# Patient Record
Sex: FEMALE | Race: WHITE | NOT HISPANIC OR LATINO | URBAN - METROPOLITAN AREA
[De-identification: names, ages, dates, MRNs, and addresses within clinical notes are randomized per-mention and may not be internally consistent; named-entity substitution may affect disease eponyms.]

---

## 2021-10-27 ENCOUNTER — LAB OUTSIDE AN ENCOUNTER (OUTPATIENT)
Dept: URBAN - METROPOLITAN AREA CLINIC 72 | Facility: CLINIC | Age: 26
End: 2021-10-27

## 2021-10-27 ENCOUNTER — OFFICE VISIT (OUTPATIENT)
Dept: URBAN - METROPOLITAN AREA CLINIC 72 | Facility: CLINIC | Age: 26
End: 2021-10-27
Payer: SELF-PAY

## 2021-10-27 ENCOUNTER — WEB ENCOUNTER (OUTPATIENT)
Dept: URBAN - METROPOLITAN AREA CLINIC 72 | Facility: CLINIC | Age: 26
End: 2021-10-27

## 2021-10-27 VITALS
HEART RATE: 72 BPM | WEIGHT: 131 LBS | BODY MASS INDEX: 21.83 KG/M2 | TEMPERATURE: 98.1 F | RESPIRATION RATE: 18 BRPM | SYSTOLIC BLOOD PRESSURE: 97 MMHG | HEIGHT: 65 IN | DIASTOLIC BLOOD PRESSURE: 53 MMHG

## 2021-10-27 DIAGNOSIS — K59.01 SLOW TRANSIT CONSTIPATION: ICD-10-CM

## 2021-10-27 DIAGNOSIS — R14.0 BLOATING: ICD-10-CM

## 2021-10-27 PROCEDURE — 99204 OFFICE O/P NEW MOD 45 MIN: CPT | Performed by: INTERNAL MEDICINE

## 2021-10-27 RX ORDER — SIMETHICONE 80 MG/1
1 TABLET AFTER MEALS AND AT BEDTIME AS NEEDED TABLET, CHEWABLE ORAL
Status: ACTIVE | COMMUNITY

## 2021-10-27 RX ORDER — POLYETHYLENE GLYCOL 3350, SODIUM SULFATE ANHYDROUS, SODIUM BICARBONATE, SODIUM CHLORIDE, POTASSIUM CHLORIDE 236; 22.74; 6.74; 5.86; 2.97 G/4L; G/4L; G/4L; G/4L; G/4L
AS DIRECTED POWDER, FOR SOLUTION ORAL ONCE
Qty: 1 | Refills: 0 | OUTPATIENT
Start: 2021-10-27 | End: 2021-10-28

## 2021-10-27 NOTE — HPI-TODAY'S VISIT:
Ms. Arce is a pleasant 26-year-old female who presents as a new patient for consultation for constipation.  She was referred by Dr. Luca Pat. In review of records she has had an upper endoscopy in 2015 for nausea and vomiting that was unremarkable. She reports that for the last 1 to 2 years she has been suffering from intermittent constipation.  She notes that she has to take laxatives in order to have a bowel movement.  She takes numerous supplements to assist with this including cascara, senna, stool softener, IBgard and FD guard, digestive enzymes etc.  She takes gas relief as well.  Associated with his constipation is significant bloating.  She notes that everything she seems the bloats her gives her abdominal discomfort.  She reports that she has siblings that were diagnosed with celiac disease.  In review of her previous upper endoscopy 2015 there is no evidence of celiac disease. 5 she denies any blood in her stool.  She used to eat a vegan diet, that did not help with her bowels.  She notes that when she went to urgent care she was given GoLYTELY which produced 3-4 small bowel movements and liquid stool thereafter.  She still felt bloated for stool.  She reports that she is done bowel cleanouts with MiraLAX and Gatorade and gets minimal stool production and then loose stools.  She denies any weight loss.  No vomiting but generalized dyspepsia and bloating sensation as well as generalized abdominal discomfort.

## 2021-10-29 ENCOUNTER — OFFICE VISIT (OUTPATIENT)
Dept: URBAN - METROPOLITAN AREA MEDICAL CENTER 40 | Facility: MEDICAL CENTER | Age: 26
End: 2021-10-29

## 2021-11-12 ENCOUNTER — TELEPHONE ENCOUNTER (OUTPATIENT)
Dept: URBAN - METROPOLITAN AREA CLINIC 72 | Facility: CLINIC | Age: 26
End: 2021-11-12

## 2021-11-18 ENCOUNTER — TELEPHONE ENCOUNTER (OUTPATIENT)
Dept: URBAN - METROPOLITAN AREA CLINIC 72 | Facility: CLINIC | Age: 26
End: 2021-11-18

## 2021-12-15 ENCOUNTER — TELEPHONE ENCOUNTER (OUTPATIENT)
Dept: URBAN - METROPOLITAN AREA CLINIC 72 | Facility: CLINIC | Age: 26
End: 2021-12-15

## 2021-12-15 RX ORDER — SIMETHICONE 80 MG/1
1 TABLET AFTER MEALS AND AT BEDTIME AS NEEDED TABLET, CHEWABLE ORAL
Status: ACTIVE | COMMUNITY

## 2021-12-15 RX ORDER — LINACLOTIDE 290 UG/1
1 CAPSULE AT LEAST 30 MINUTES BEFORE THE FIRST MEAL OF THE DAY ON AN EMPTY STOMACH CAPSULE, GELATIN COATED ORAL ONCE A DAY
Qty: 30 | Refills: 3 | OUTPATIENT
Start: 2021-12-15 | End: 2022-04-14

## 2022-02-09 ENCOUNTER — TELEPHONE ENCOUNTER (OUTPATIENT)
Dept: URBAN - METROPOLITAN AREA CLINIC 72 | Facility: CLINIC | Age: 27
End: 2022-02-09

## 2022-02-24 ENCOUNTER — TELEPHONE ENCOUNTER (OUTPATIENT)
Dept: URBAN - METROPOLITAN AREA CLINIC 72 | Facility: CLINIC | Age: 27
End: 2022-02-24

## 2022-03-29 ENCOUNTER — TELEPHONE ENCOUNTER (OUTPATIENT)
Dept: URBAN - METROPOLITAN AREA CLINIC 72 | Facility: CLINIC | Age: 27
End: 2022-03-29

## 2022-04-01 ENCOUNTER — OFFICE VISIT (OUTPATIENT)
Dept: URBAN - METROPOLITAN AREA CLINIC 72 | Facility: CLINIC | Age: 27
End: 2022-04-01
Payer: SELF-PAY

## 2022-04-01 VITALS
SYSTOLIC BLOOD PRESSURE: 110 MMHG | HEART RATE: 86 BPM | BODY MASS INDEX: 20.66 KG/M2 | HEIGHT: 65 IN | DIASTOLIC BLOOD PRESSURE: 75 MMHG | TEMPERATURE: 98.4 F | WEIGHT: 124 LBS | RESPIRATION RATE: 16 BRPM

## 2022-04-01 DIAGNOSIS — K59.01 SLOW TRANSIT CONSTIPATION: ICD-10-CM

## 2022-04-01 DIAGNOSIS — R14.0 BLOATING: ICD-10-CM

## 2022-04-01 PROCEDURE — 99214 OFFICE O/P EST MOD 30 MIN: CPT | Performed by: INTERNAL MEDICINE

## 2022-04-01 RX ORDER — LINACLOTIDE 290 UG/1
1 CAPSULE AT LEAST 30 MINUTES BEFORE THE FIRST MEAL OF THE DAY ON AN EMPTY STOMACH CAPSULE, GELATIN COATED ORAL ONCE A DAY
Qty: 30 | Refills: 3 | Status: ACTIVE | COMMUNITY
Start: 2021-12-15 | End: 2022-04-14

## 2022-04-01 RX ORDER — LINACLOTIDE 290 UG/1
1 CAPSULE AT LEAST 30 MINUTES BEFORE THE FIRST MEAL OF THE DAY ON AN EMPTY STOMACH CAPSULE, GELATIN COATED ORAL ONCE A DAY
OUTPATIENT
Start: 2021-12-15 | End: 2022-04-14

## 2022-04-01 RX ORDER — SIMETHICONE 80 MG/1
1 TABLET AFTER MEALS AND AT BEDTIME AS NEEDED TABLET, CHEWABLE ORAL
Status: ACTIVE | COMMUNITY

## 2022-04-01 RX ORDER — PRUCALOPRIDE 2 MG/1
1 TABLET TABLET, FILM COATED ORAL ONCE A DAY
Qty: 30 | OUTPATIENT
Start: 2022-04-01 | End: 2022-05-01

## 2022-04-01 NOTE — HPI-TODAY'S VISIT:
Ms. Arce is a pleasant 26-year-old female who returns for follow-up, she was last seen in our office on 10/27/2021.  Has a history of constipation.  Has used laxatives including cascara, senna, stool softener, Ibguard and FD guard as well as digestive enzymes.  She has bloating and gas pains as well, has had an EGD in 2015 with biopsy showing no evidence of celiac disease unremarkable.  We gave her samples of Linzess 290 mcg and advised a low FODMAPs diet discussed colonoscopy but she ultimately canceled the procedure. She is still having issues with constipation, she takes Linzess and 3 laxative tablets daily and has very small bowel movements associated cramping bloating and incomplete evacuation.  She feels like there is room to improve.  She is also felt cascara and senna as well as over-the-counter stool softeners.  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

## 2022-04-05 ENCOUNTER — OFFICE VISIT (OUTPATIENT)
Dept: URBAN - METROPOLITAN AREA CLINIC 72 | Facility: CLINIC | Age: 27
End: 2022-04-05

## 2022-04-15 ENCOUNTER — TELEPHONE ENCOUNTER (OUTPATIENT)
Dept: URBAN - METROPOLITAN AREA CLINIC 72 | Facility: CLINIC | Age: 27
End: 2022-04-15

## 2022-04-28 ENCOUNTER — OFFICE VISIT (OUTPATIENT)
Dept: URBAN - METROPOLITAN AREA CLINIC 72 | Facility: CLINIC | Age: 27
End: 2022-04-28

## 2022-04-28 RX ORDER — SIMETHICONE 80 MG/1
1 TABLET AFTER MEALS AND AT BEDTIME AS NEEDED TABLET, CHEWABLE ORAL
Status: ACTIVE | COMMUNITY

## 2022-04-28 RX ORDER — PRUCALOPRIDE 2 MG/1
1 TABLET TABLET, FILM COATED ORAL ONCE A DAY
Qty: 30 | Status: ACTIVE | COMMUNITY
Start: 2022-04-01 | End: 2022-05-01

## 2022-04-28 NOTE — HPI-TODAY'S VISIT:
Ms. Arce returns for follow-up.  She was last seen in office on 4/1/2022.  She has a history of chronic idiopathic constipation nonresponsive to cascara, senna, stool softeners and digestive enzymes.  Associated with her chronic constipation is gas and bloating.  She did have an EGD in 2015 that was unremarkable.  She is trialed Linzess 290 mcg and a low FODMAPs diet with minimal improvement.  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  Ultimately to leave her constipation we have tried Motegrity which she reported she has had some response to.  She unfortunately still suffering from bloating.

## 2022-05-02 ENCOUNTER — TELEPHONE ENCOUNTER (OUTPATIENT)
Dept: URBAN - METROPOLITAN AREA CLINIC 72 | Facility: CLINIC | Age: 27
End: 2022-05-02

## 2022-05-02 RX ORDER — PRUCALOPRIDE 2 MG/1
1 TABLET TABLET, FILM COATED ORAL ONCE A DAY
Qty: 30 | OUTPATIENT
Start: 2022-05-02 | End: 2022-06-01

## 2022-05-10 ENCOUNTER — OFFICE VISIT (OUTPATIENT)
Dept: URBAN - METROPOLITAN AREA CLINIC 72 | Facility: CLINIC | Age: 27
End: 2022-05-10

## 2022-06-03 ENCOUNTER — TELEPHONE ENCOUNTER (OUTPATIENT)
Dept: URBAN - METROPOLITAN AREA CLINIC 72 | Facility: CLINIC | Age: 27
End: 2022-06-03

## 2022-06-03 RX ORDER — PRUCALOPRIDE 2 MG/1
1 TABLET TABLET, FILM COATED ORAL ONCE A DAY
Qty: 90 TABLET | Refills: 0

## 2022-07-18 ENCOUNTER — TELEPHONE ENCOUNTER (OUTPATIENT)
Dept: URBAN - METROPOLITAN AREA CLINIC 72 | Facility: CLINIC | Age: 27
End: 2022-07-18

## 2022-07-18 RX ORDER — PRUCALOPRIDE 2 MG/1
1 TABLET TABLET, FILM COATED ORAL ONCE A DAY
Qty: 90 TABLET | Refills: 0
End: 2022-10-16

## 2022-07-19 ENCOUNTER — OFFICE VISIT (OUTPATIENT)
Dept: URBAN - METROPOLITAN AREA CLINIC 72 | Facility: CLINIC | Age: 27
End: 2022-07-19

## 2022-09-20 ENCOUNTER — TELEPHONE ENCOUNTER (OUTPATIENT)
Dept: URBAN - METROPOLITAN AREA CLINIC 72 | Facility: CLINIC | Age: 27
End: 2022-09-20

## 2022-09-27 ENCOUNTER — OFFICE VISIT (OUTPATIENT)
Dept: URBAN - METROPOLITAN AREA CLINIC 72 | Facility: CLINIC | Age: 27
End: 2022-09-27

## 2022-09-27 RX ORDER — SIMETHICONE 80 MG/1
1 TABLET AFTER MEALS AND AT BEDTIME AS NEEDED TABLET, CHEWABLE ORAL
Status: ACTIVE | COMMUNITY

## 2022-09-27 RX ORDER — PRUCALOPRIDE 2 MG/1
1 TABLET TABLET, FILM COATED ORAL ONCE A DAY
Qty: 90 TABLET | Refills: 0 | Status: ACTIVE | COMMUNITY
End: 2022-10-16

## 2022-09-27 NOTE — HPI-TODAY'S VISIT:
Mrs. Arce returns for follow-up, she was last seen in our office on 4/1/2022.  She has history of chronic constipation complicated by gas and bloating.  Has tried cascara, senna, stool softener, IBgard, FD guard and digestive enzymes in the past.  Has also been on Linzess to 90 mcg and low FODMAPs diet as well.  We have attempted multiple times to arrange EGD and colonoscopy for evaluation but she has canceled these procedures and missed multiple follow-up appointments.  Last office visit in April we stopped the Linzess and added Motegrity 2 mg daily with good response.

## 2022-10-05 ENCOUNTER — OFFICE VISIT (OUTPATIENT)
Dept: URBAN - METROPOLITAN AREA CLINIC 72 | Facility: CLINIC | Age: 27
End: 2022-10-05
Payer: SELF-PAY

## 2022-10-05 VITALS
WEIGHT: 128 LBS | SYSTOLIC BLOOD PRESSURE: 106 MMHG | TEMPERATURE: 98 F | HEART RATE: 70 BPM | DIASTOLIC BLOOD PRESSURE: 64 MMHG | HEIGHT: 65 IN | BODY MASS INDEX: 21.33 KG/M2

## 2022-10-05 DIAGNOSIS — K59.01 SLOW TRANSIT CONSTIPATION: ICD-10-CM

## 2022-10-05 DIAGNOSIS — R14.0 BLOATING: ICD-10-CM

## 2022-10-05 PROBLEM — 35298007: Status: ACTIVE | Noted: 2021-10-27

## 2022-10-05 PROCEDURE — 99214 OFFICE O/P EST MOD 30 MIN: CPT | Performed by: INTERNAL MEDICINE

## 2022-10-05 RX ORDER — PRUCALOPRIDE 2 MG/1
1 TABLET TABLET, FILM COATED ORAL ONCE A DAY
Qty: 90 TABLET | Refills: 0 | Status: ACTIVE | COMMUNITY
End: 2022-10-16

## 2022-10-05 RX ORDER — PRUCALOPRIDE 2 MG/1
1 TABLET TABLET, FILM COATED ORAL ONCE A DAY
Qty: 90 | Refills: 0
End: 2023-01-03

## 2022-10-05 RX ORDER — FLUOXETINE HYDROCHLORIDE 20 MG/1
1 CAPSULE CAPSULE ORAL ONCE A DAY
Status: ACTIVE | COMMUNITY

## 2022-10-05 RX ORDER — SIMETHICONE 80 MG/1
1 TABLET AFTER MEALS AND AT BEDTIME AS NEEDED TABLET, CHEWABLE ORAL
Status: ACTIVE | COMMUNITY

## 2022-10-05 NOTE — HPI-TODAY'S VISIT:
Mrs. Arce returns for follow-up, she was last seen in our office on 4/1/2022.  She has history of chronic constipation complicated by gas and bloating.  Has tried cascara, senna, stool softener, IBgard, FD guard and digestive enzymes in the past.  Has also been on Linzess to 90 mcg and low FODMAPs diet as well.  We have attempted multiple times to arrange EGD and colonoscopy for evaluation but she has canceled these procedures and missed multiple follow-up appointments.  Last office visit in April we stopped the Linzess and added Motegrity 2 mg daily with good response.  She has bowel movements with the Motegrity but also takes an over the counter herbal supplement to assist with her bowel movements notes that she has loose stools when she takes both of those if she does not take either she will be constipated for 5 to 7 days.  She does have abdominal discomfort but feels like the Motegrity does help that tremendously.  She currently has difficulty eating certain vegetables, gluten, dairy and now soy issues.  She is eating a high-protein diet.  She has not been able to tolerate fiber.  She feels like she sees oily sheen in her stool.

## 2023-04-05 ENCOUNTER — OFFICE VISIT (OUTPATIENT)
Dept: URBAN - METROPOLITAN AREA CLINIC 72 | Facility: CLINIC | Age: 28
End: 2023-04-05

## 2023-08-23 ENCOUNTER — OFFICE VISIT (OUTPATIENT)
Dept: URBAN - METROPOLITAN AREA CLINIC 72 | Facility: CLINIC | Age: 28
End: 2023-08-23
Payer: SELF-PAY

## 2023-08-23 ENCOUNTER — WEB ENCOUNTER (OUTPATIENT)
Dept: URBAN - METROPOLITAN AREA CLINIC 72 | Facility: CLINIC | Age: 28
End: 2023-08-23

## 2023-08-23 ENCOUNTER — TELEPHONE ENCOUNTER (OUTPATIENT)
Dept: URBAN - METROPOLITAN AREA CLINIC 72 | Facility: CLINIC | Age: 28
End: 2023-08-23

## 2023-08-23 VITALS
TEMPERATURE: 97.3 F | DIASTOLIC BLOOD PRESSURE: 76 MMHG | WEIGHT: 120 LBS | BODY MASS INDEX: 19.99 KG/M2 | HEART RATE: 59 BPM | HEIGHT: 65 IN | SYSTOLIC BLOOD PRESSURE: 92 MMHG

## 2023-08-23 DIAGNOSIS — R10.13 EPIGASTRIC PAIN: ICD-10-CM

## 2023-08-23 DIAGNOSIS — R11.2 ACUTE NAUSEA WITH NONBILIOUS VOMITING: ICD-10-CM

## 2023-08-23 DIAGNOSIS — K59.04 CHRONIC IDIOPATHIC CONSTIPATION: ICD-10-CM

## 2023-08-23 DIAGNOSIS — R14.0 ABDOMINAL BLOATING: ICD-10-CM

## 2023-08-23 PROBLEM — 82934008: Status: ACTIVE | Noted: 2023-08-23

## 2023-08-23 PROCEDURE — 99214 OFFICE O/P EST MOD 30 MIN: CPT | Performed by: INTERNAL MEDICINE

## 2023-08-23 RX ORDER — ESCITALOPRAM OXALATE 10 MG/1
1 TABLET TABLET, FILM COATED ORAL ONCE A DAY
Status: ACTIVE | COMMUNITY

## 2023-08-23 RX ORDER — PRUCALOPRIDE 2 MG/1
1 TABLET TABLET, FILM COATED ORAL ONCE A DAY
Qty: 30 | Refills: 11

## 2023-08-23 RX ORDER — FLUOXETINE HYDROCHLORIDE 20 MG/1
1 CAPSULE CAPSULE ORAL ONCE A DAY
Status: ON HOLD | COMMUNITY

## 2023-08-23 RX ORDER — PRUCALOPRIDE 2 MG/1
TAKE ONE TABLET BY MOUTH ONE TIME DAILY TABLET, FILM COATED ORAL
Qty: 30 UNSPECIFIED | Refills: 0 | Status: ACTIVE | COMMUNITY

## 2023-08-23 RX ORDER — OMEPRAZOLE 20 MG/1
1 TABLET 30 MINUTES BEFORE MORNING MEAL TABLET, DELAYED RELEASE ORAL ONCE A DAY
Qty: 30 | Refills: 1 | OUTPATIENT
Start: 2023-08-23

## 2023-08-23 RX ORDER — SIMETHICONE 80 MG/1
1 TABLET AFTER MEALS AND AT BEDTIME AS NEEDED TABLET, CHEWABLE ORAL
Status: ON HOLD | COMMUNITY

## 2023-08-23 RX ORDER — PRUCALOPRIDE 2 MG/1
1 TABLET TABLET, FILM COATED ORAL ONCE A DAY
Qty: 30 | Refills: 11
End: 2024-08-30

## 2023-08-23 NOTE — HPI-TODAY'S VISIT:
28-year-old female here for vomiting and "leaky gut"  Last seen 10/5/2022 for prescription refill for her Motegrity for constipation.  For bloating fecal fat and fecal pancreatic elastase ordered for further evaluation.   She has history of chronic constipation complicated by gas and bloating.  Has tried cascara, senna, stool softener, IBgard, FD guard and digestive enzymes in the past.  Has also been on Linzess to 90 mcg and low FODMAPs diet as well.  We have attempted multiple times to arrange EGD and colonoscopy for evaluation but she has canceled these procedures and missed multiple follow-up appointments.  Last office visit in April we stopped the Linzess and added Motegrity 2 mg daily with good response.  Sees a nutitionist and was tested for SIBO and H.Pylori. Sees the dietician was told if she started on antibiotic with her underlying SIBO she could go into anaphylaxis and die.  She is not allergic to any antibiotics that she is aware of.  She takes multiple digestive enzyme supplements and states she can't stop or will "die." Chronic constipation.  On Motegrity, took a break and was using magnesium citrate but that stopped working so she recently and just started back on it.   She reports severe bloating. Weight is down 8 pounds from her last appointment.  Denies NSIAD.  She needs prior auth for Motegrity. Her insurance runs out at the end of the month and is going to sign up for a the health insurance marketplace.    She wants a referral to [facility] for POTS workup.    LMP:  Currently menstruating  GI map 7/21/2023.  PCR negative.  H. pylori elevated at 6.29.  Yeast present.  Fecal calprotectin normal.  Hemoccult normal.  CBC and CMP at [facility] 7/24/23 was normal

## 2023-09-06 ENCOUNTER — TELEPHONE ENCOUNTER (OUTPATIENT)
Dept: URBAN - METROPOLITAN AREA CLINIC 72 | Facility: CLINIC | Age: 28
End: 2023-09-06

## 2023-10-20 ENCOUNTER — OFFICE VISIT (OUTPATIENT)
Dept: URBAN - METROPOLITAN AREA CLINIC 72 | Facility: CLINIC | Age: 28
End: 2023-10-20
Payer: SELF-PAY

## 2023-10-20 ENCOUNTER — DASHBOARD ENCOUNTERS (OUTPATIENT)
Age: 28
End: 2023-10-20

## 2023-10-20 VITALS
DIASTOLIC BLOOD PRESSURE: 61 MMHG | HEART RATE: 83 BPM | WEIGHT: 119.6 LBS | BODY MASS INDEX: 19.93 KG/M2 | SYSTOLIC BLOOD PRESSURE: 100 MMHG | TEMPERATURE: 97.5 F | HEIGHT: 65 IN

## 2023-10-20 DIAGNOSIS — A04.8 BACTERIAL INFECTION DUE TO H. PYLORI: ICD-10-CM

## 2023-10-20 DIAGNOSIS — R11.2 ACUTE NAUSEA WITH NONBILIOUS VOMITING: ICD-10-CM

## 2023-10-20 DIAGNOSIS — K59.04 CHRONIC IDIOPATHIC CONSTIPATION: ICD-10-CM

## 2023-10-20 DIAGNOSIS — R10.84 GENERALIZED ABDOMINAL PAIN: ICD-10-CM

## 2023-10-20 PROCEDURE — 99214 OFFICE O/P EST MOD 30 MIN: CPT | Performed by: INTERNAL MEDICINE

## 2023-10-20 RX ORDER — OMEPRAZOLE 20 MG/1
1 TABLET 30 MINUTES BEFORE MORNING MEAL TABLET, DELAYED RELEASE ORAL ONCE A DAY
Qty: 30 | Refills: 1 | Status: ON HOLD | COMMUNITY
Start: 2023-08-23

## 2023-10-20 RX ORDER — METRONIDAZOLE 500 MG/1
1 TABLET TABLET ORAL THREE TIMES A DAY
Qty: 42 TABLET | Refills: 0 | OUTPATIENT
Start: 2023-10-20 | End: 2023-11-03

## 2023-10-20 RX ORDER — PRUCALOPRIDE 2 MG/1
1 TABLET TABLET, FILM COATED ORAL ONCE A DAY
Qty: 30 | Refills: 11 | Status: ACTIVE | COMMUNITY
End: 2024-08-30

## 2023-10-20 RX ORDER — ESCITALOPRAM OXALATE 10 MG/1
1 TABLET TABLET, FILM COATED ORAL ONCE A DAY
Status: ON HOLD | COMMUNITY

## 2023-10-20 RX ORDER — PRUCALOPRIDE 2 MG/1
1 TABLET TABLET, FILM COATED ORAL ONCE A DAY
Qty: 30 | Refills: 11
End: 2024-10-14

## 2023-10-20 RX ORDER — CLARITHROMYCIN 500 MG/1
1 TABLET TABLET, FILM COATED ORAL
Qty: 28 TABLET | Refills: 0 | OUTPATIENT
Start: 2023-10-20 | End: 2023-11-03

## 2023-10-20 RX ORDER — FLUOXETINE HYDROCHLORIDE 20 MG/1
1 CAPSULE CAPSULE ORAL ONCE A DAY
Status: ON HOLD | COMMUNITY

## 2023-10-20 RX ORDER — OMEPRAZOLE 20 MG/1
1 CAPSULE 30 MINUTES BEFORE MORNING MEAL CAPSULE, DELAYED RELEASE ORAL ONCE A DAY
Qty: 30 | Refills: 0 | OUTPATIENT
Start: 2023-10-20

## 2023-10-20 RX ORDER — SIMETHICONE 80 MG/1
1 TABLET AFTER MEALS AND AT BEDTIME AS NEEDED TABLET, CHEWABLE ORAL
Status: ON HOLD | COMMUNITY

## 2023-10-20 NOTE — HPI-OTHER HISTORIES
Labs: -GI map 7/21/2023. PCR negative. H. pylori elevated at 6.29. Yeast present. Fecal calprotectin normal. Hemoccult normal. -CBC and CMP at [facility] 7/24/23 was normal  Procedures: -Upper endoscopy in 2015 for nausea and vomiting that was unremarkable with no evidence of celiac disease.

## 2023-10-20 NOTE — HPI-TODAY'S VISIT:
28-year-old female here for nausea, vomiting and constipation.    Last seen 8/23/2023 for nausea and vomiting, abdominal bloating, epigastric pain and chronic idiopathic constipation.  Started on omeprazole 20 mg daily.  Advised to continue Motegrity 2 mg daily.  She requested referral to [facility] for possible POTS.  Also requested referral to allergist.  She was concerned about an anaphylactic reaction if she was put on an antibiotic due to the bacteria in her intestines.  She has no known medication allergy-does have vomiting with sulfa products. She was provided the name with the allergist advised she can make an appointment herself.  Recommend EGD and colonoscopy she was given prices at [place] due to insurance coverage.  Motegrity was too expensive.     She has history of chronic constipation complicated by gas and bloating.  Has tried cascara, senna, stool softener, IBgard, FD guard and digestive enzymes in the past.  Has also been on Linzess 290 mcg and low FODMAPs diet as well.  We have attempted multiple times to arrange EGD and colonoscopy for evaluation but she has canceled these procedures and missed multiple follow-up appointments.  Last office visit in April we stopped the Linzess and added Motegrity 2 mg daily with good response.  Was seen previously by a nutitionist and was tested for SIBO and H.Pylori. Sees the dietician was told if she started on antibiotic with her underlying SIBO she could go into anaphylaxis and die.  She is not allergic to any antibiotics that she is aware of.  She takes multiple digestive enzyme supplements and states she can't stop or will "die."   She is here today with her mother.  She has had to quit her job due to the pain and has marketplace insurance. Reports 15 pound weight loss.  According to records her weight is down 1 pound from her last appointment.  She can tolerate liquids, but gets pain to abdominal area with most any solid food. Dairy makes her constipated and causes pain. She has been gluten free for a year, soy free, dairy free, grain free. Only eats almond flour, canned carrots and a couple of other foods. Tried Golytely on Wednesday that was prescribed by [facility].  Motegrity is not working for constipation with daily miralax. Magnesium citrate didn't work.  Was trying meditation and coffee. ausea medicine doesn't help.  No melena or hematochezia.  [place] [facility] [place] cardiologist for her POTS.  LMP: Denies pregnancy.

## 2023-10-23 ENCOUNTER — TELEPHONE ENCOUNTER (OUTPATIENT)
Dept: URBAN - METROPOLITAN AREA CLINIC 113 | Facility: CLINIC | Age: 28
End: 2023-10-23

## 2023-10-30 ENCOUNTER — TELEPHONE ENCOUNTER (OUTPATIENT)
Dept: URBAN - METROPOLITAN AREA CLINIC 113 | Facility: CLINIC | Age: 28
End: 2023-10-30

## 2023-11-07 ENCOUNTER — TELEPHONE ENCOUNTER (OUTPATIENT)
Dept: URBAN - METROPOLITAN AREA CLINIC 113 | Facility: CLINIC | Age: 28
End: 2023-11-07

## 2023-11-18 ENCOUNTER — ERX REFILL RESPONSE (OUTPATIENT)
Dept: URBAN - METROPOLITAN AREA CLINIC 72 | Facility: CLINIC | Age: 28
End: 2023-11-18

## 2023-11-18 RX ORDER — OMEPRAZOLE 20 MG/1
1 CAPSULE 30 MINUTES BEFORE MORNING MEAL CAPSULE, DELAYED RELEASE ORAL ONCE A DAY
Qty: 30 | Refills: 0 | OUTPATIENT

## 2023-11-18 RX ORDER — OMEPRAZOLE 20 MG/1
1 CAPSULE 30 MINUTES BEFORE MORNING MEAL CAPSULE, DELAYED RELEASE ORAL ONCE A DAY
Qty: 30 | Refills: 3 | OUTPATIENT

## 2024-01-01 ENCOUNTER — LAB OUTSIDE AN ENCOUNTER (OUTPATIENT)
Dept: URBAN - METROPOLITAN AREA CLINIC 72 | Facility: CLINIC | Age: 29
End: 2024-01-01